# Patient Record
Sex: MALE | Race: WHITE | Employment: FULL TIME | URBAN - METROPOLITAN AREA
[De-identification: names, ages, dates, MRNs, and addresses within clinical notes are randomized per-mention and may not be internally consistent; named-entity substitution may affect disease eponyms.]

---

## 2021-06-04 ENCOUNTER — OFFICE VISIT (OUTPATIENT)
Dept: PRIMARY CARE CLINIC | Age: 41
End: 2021-06-04
Payer: COMMERCIAL

## 2021-06-04 DIAGNOSIS — E03.9 ACQUIRED HYPOTHYROIDISM: ICD-10-CM

## 2021-06-04 DIAGNOSIS — Z00.01 ENCOUNTER FOR ANNUAL GENERAL MEDICAL EXAMINATION WITH ABNORMAL FINDINGS IN ADULT: ICD-10-CM

## 2021-06-04 DIAGNOSIS — F42.8 OTHER OBSESSIVE-COMPULSIVE DISORDERS: ICD-10-CM

## 2021-06-04 DIAGNOSIS — F41.0 PANIC ATTACKS: ICD-10-CM

## 2021-06-04 DIAGNOSIS — F41.9 ANXIETY: Primary | ICD-10-CM

## 2021-06-04 DIAGNOSIS — E78.2 MIXED HYPERLIPIDEMIA: ICD-10-CM

## 2021-06-04 PROCEDURE — 99386 PREV VISIT NEW AGE 40-64: CPT | Performed by: FAMILY MEDICINE

## 2021-06-04 RX ORDER — ESCITALOPRAM OXALATE 10 MG/1
TABLET ORAL
Qty: 30 TABLET | Refills: 3 | Status: SHIPPED
Start: 2021-06-04 | End: 2021-08-11 | Stop reason: SDUPTHER

## 2021-06-04 RX ORDER — HYDROXYZINE PAMOATE 25 MG/1
25 CAPSULE ORAL 2 TIMES DAILY PRN
Qty: 50 CAPSULE | Refills: 1 | Status: SHIPPED
Start: 2021-06-04 | End: 2021-11-18 | Stop reason: SDUPTHER

## 2021-06-04 NOTE — PROGRESS NOTES
21  Name: Altagracia Cristina    : 1980    Sex: male    Age: 39 y.o. Subjective:  Chief Complaint: Patient is here for est as a new patient and anxiety     Presents to establish as a new patient. I see him in the past probably 10 years ago. He also has significant anxiety issues lately. He has some panic attacks at work. He has had this ongoing problem now for several months he refuses see a psychiatrist or counselor. He had anxiety in the past he knows what this is. He broke up with a girlfriend January. Has some family issues and does not like his job. He does not work this week. Has been drinking a little more alcohol lately. Denies depression or thoughts of hurting himself. Mostly having anxiety      Medical history is positive for anxiety 10 years ago  Hyperlipidemia    Surgical history is positive for ear tubes, adenoids, left hand fourth finger tendon rupture age 21      Social history. Does admit occasional cigars. Non-smoker of cigarettes  Single  No children  Mother is living with atrial septal defect  Father is living with diabetes and anxiety  1 sister as well  Weight is the same  Urine and bowel movements are good  EtOH daily  Drugs Daily marijuana user  Job  graphic design      Review of Systems   Constitutional: Negative. HENT: Negative. Eyes: Negative. Respiratory: Negative. Cardiovascular: Negative. Gastrointestinal: Negative. Endocrine: Negative. Genitourinary: Negative. Musculoskeletal: Negative. Skin: Negative. Allergic/Immunologic: Negative. Neurological: Negative. Hematological: Negative. Psychiatric/Behavioral: The patient is nervous/anxious.           Current Outpatient Medications:     escitalopram (LEXAPRO) 10 MG tablet, One half q am for ten days then one q am, Disp: 30 tablet, Rfl: 3    hydrOXYzine (VISTARIL) 25 MG capsule, Take 1 capsule by mouth 2 times daily as needed for Anxiety, Disp: 50 capsule, Rfl: 1 No Known Allergies  Social History     Socioeconomic History    Marital status: Single     Spouse name: Not on file    Number of children: Not on file    Years of education: Not on file    Highest education level: Not on file   Occupational History    Not on file   Tobacco Use    Smoking status: Not on file   Substance and Sexual Activity    Alcohol use: Not on file    Drug use: Not on file    Sexual activity: Not on file   Other Topics Concern    Not on file   Social History Narrative    Anxiety . Single no children    . OCD, panic attacks    . EtOH    Daily marijuana user     digital graphic design     Social Determinants of Health     Financial Resource Strain:     Difficulty of Paying Living Expenses:    Food Insecurity:     Worried About Running Out of Food in the Last Year:     [address] in the Last Year:    Transportation Needs:     Lack of Transportation (Medical):  Lack of Transportation (Non-Medical):    Physical Activity:     Days of Exercise per Week:     Minutes of Exercise per Session:    Stress:     Feeling of Stress :    Social Connections:     Frequency of Communication with Friends and Family:     Frequency of Social Gatherings with Friends and Family:     Attends Synagogue Services:     Active Member of Clubs or Organizations:     Attends Club or Organization Meetings:     Marital Status:    Intimate Partner Violence:     Fear of Current or Ex-Partner:     Emotionally Abused:     Physically Abused:     Sexually Abused:       No past medical history on file. No family history on file. No past surgical history on file. Vitals:    06/04/21 1559   BP: 128/78   Pulse: 71   Temp: 97.6 °F (36.4 °C)   TempSrc: Temporal   Weight: 220 lb 9.6 oz (100.1 kg)   Height: 6' 3\" (1.905 m)       Objective:    Physical Exam  Vitals reviewed. Constitutional:       Appearance: He is well-developed. HENT:      Head: Normocephalic.    Eyes:      Pupils: 6/25/2021).      Seen by:  Dolly Donovan, DO

## 2021-06-06 VITALS
HEIGHT: 75 IN | SYSTOLIC BLOOD PRESSURE: 128 MMHG | DIASTOLIC BLOOD PRESSURE: 78 MMHG | HEART RATE: 71 BPM | BODY MASS INDEX: 27.43 KG/M2 | WEIGHT: 220.6 LBS | TEMPERATURE: 97.6 F

## 2021-06-06 PROBLEM — F41.9 ANXIETY: Status: ACTIVE | Noted: 2021-06-06

## 2021-06-06 PROBLEM — E78.2 MIXED HYPERLIPIDEMIA: Status: ACTIVE | Noted: 2021-06-06

## 2021-06-06 PROBLEM — F42.9 OBSESSIVE COMPULSIVE DISORDER: Status: ACTIVE | Noted: 2021-06-06

## 2021-06-06 PROBLEM — F41.0 PANIC ATTACKS: Status: ACTIVE | Noted: 2021-06-06

## 2021-06-06 ASSESSMENT — PATIENT HEALTH QUESTIONNAIRE - PHQ9
SUM OF ALL RESPONSES TO PHQ QUESTIONS 1-9: 0
SUM OF ALL RESPONSES TO PHQ QUESTIONS 1-9: 0
SUM OF ALL RESPONSES TO PHQ9 QUESTIONS 1 & 2: 0
SUM OF ALL RESPONSES TO PHQ QUESTIONS 1-9: 0
1. LITTLE INTEREST OR PLEASURE IN DOING THINGS: 0
2. FEELING DOWN, DEPRESSED OR HOPELESS: 0

## 2021-06-06 ASSESSMENT — ENCOUNTER SYMPTOMS
EYES NEGATIVE: 1
ALLERGIC/IMMUNOLOGIC NEGATIVE: 1
RESPIRATORY NEGATIVE: 1
GASTROINTESTINAL NEGATIVE: 1

## 2021-06-07 ENCOUNTER — TELEPHONE (OUTPATIENT)
Dept: PRIMARY CARE CLINIC | Age: 41
End: 2021-06-07

## 2021-06-07 DIAGNOSIS — E03.9 ACQUIRED HYPOTHYROIDISM: ICD-10-CM

## 2021-06-07 DIAGNOSIS — Z00.01 ENCOUNTER FOR ANNUAL GENERAL MEDICAL EXAMINATION WITH ABNORMAL FINDINGS IN ADULT: ICD-10-CM

## 2021-06-07 LAB
ALBUMIN SERPL-MCNC: 4.8 G/DL (ref 3.5–5.2)
ALP BLD-CCNC: 56 U/L (ref 40–129)
ALT SERPL-CCNC: 15 U/L (ref 0–40)
ANION GAP SERPL CALCULATED.3IONS-SCNC: 12 MMOL/L (ref 7–16)
AST SERPL-CCNC: 17 U/L (ref 0–39)
BASOPHILS ABSOLUTE: 0.05 E9/L (ref 0–0.2)
BASOPHILS RELATIVE PERCENT: 1 % (ref 0–2)
BILIRUB SERPL-MCNC: 0.5 MG/DL (ref 0–1.2)
BUN BLDV-MCNC: 18 MG/DL (ref 6–20)
CALCIUM SERPL-MCNC: 9.6 MG/DL (ref 8.6–10.2)
CHLORIDE BLD-SCNC: 102 MMOL/L (ref 98–107)
CHOLESTEROL, TOTAL: 212 MG/DL (ref 0–199)
CO2: 25 MMOL/L (ref 22–29)
CREAT SERPL-MCNC: 1.2 MG/DL (ref 0.7–1.2)
EOSINOPHILS ABSOLUTE: 0.08 E9/L (ref 0.05–0.5)
EOSINOPHILS RELATIVE PERCENT: 1.7 % (ref 0–6)
GFR AFRICAN AMERICAN: >60
GFR NON-AFRICAN AMERICAN: >60 ML/MIN/1.73
GLUCOSE BLD-MCNC: 109 MG/DL (ref 74–99)
HCT VFR BLD CALC: 45.5 % (ref 37–54)
HDLC SERPL-MCNC: 64 MG/DL
HEMOGLOBIN: 14.6 G/DL (ref 12.5–16.5)
IMMATURE GRANULOCYTES #: 0.01 E9/L
IMMATURE GRANULOCYTES %: 0.2 % (ref 0–5)
LDL CHOLESTEROL CALCULATED: 128 MG/DL (ref 0–99)
LYMPHOCYTES ABSOLUTE: 1.73 E9/L (ref 1.5–4)
LYMPHOCYTES RELATIVE PERCENT: 36.3 % (ref 20–42)
MCH RBC QN AUTO: 31.3 PG (ref 26–35)
MCHC RBC AUTO-ENTMCNC: 32.1 % (ref 32–34.5)
MCV RBC AUTO: 97.4 FL (ref 80–99.9)
MONOCYTES ABSOLUTE: 0.5 E9/L (ref 0.1–0.95)
MONOCYTES RELATIVE PERCENT: 10.5 % (ref 2–12)
NEUTROPHILS ABSOLUTE: 2.4 E9/L (ref 1.8–7.3)
NEUTROPHILS RELATIVE PERCENT: 50.3 % (ref 43–80)
PDW BLD-RTO: 12.8 FL (ref 11.5–15)
PLATELET # BLD: 302 E9/L (ref 130–450)
PMV BLD AUTO: 10.7 FL (ref 7–12)
POTASSIUM SERPL-SCNC: 4.2 MMOL/L (ref 3.5–5)
PROSTATE SPECIFIC ANTIGEN: 1.55 NG/ML (ref 0–4)
RBC # BLD: 4.67 E12/L (ref 3.8–5.8)
SODIUM BLD-SCNC: 139 MMOL/L (ref 132–146)
TOTAL PROTEIN: 6.9 G/DL (ref 6.4–8.3)
TRIGL SERPL-MCNC: 99 MG/DL (ref 0–149)
TSH SERPL DL<=0.05 MIU/L-ACNC: 1.57 UIU/ML (ref 0.27–4.2)
VLDLC SERPL CALC-MCNC: 20 MG/DL
WBC # BLD: 4.8 E9/L (ref 4.5–11.5)

## 2021-06-08 ENCOUNTER — TELEPHONE (OUTPATIENT)
Dept: PRIMARY CARE CLINIC | Age: 41
End: 2021-06-08

## 2021-06-08 NOTE — TELEPHONE ENCOUNTER
Left message to return call. Patient notified. Will watch sweets and carbs along with fatty foods. Will call to schedule an appointment in a few weeks.

## 2021-06-08 NOTE — TELEPHONE ENCOUNTER
Tell patient his lab is okay. Cholesterol just a hair up to 12. Watch greasy fried foods and fats. Also his fasting sugar is 109. Ask if he fasted for that blood work. I like to see him in about 3weeks with fasting fingerstick sugar and hemoglobin A1c.   Have him cut back on sugars and carbs

## 2021-07-12 DIAGNOSIS — F41.9 ANXIETY: ICD-10-CM

## 2021-08-11 ENCOUNTER — TELEPHONE (OUTPATIENT)
Dept: PRIMARY CARE CLINIC | Age: 41
End: 2021-08-11

## 2021-08-11 DIAGNOSIS — F41.9 ANXIETY: ICD-10-CM

## 2021-08-11 RX ORDER — ESCITALOPRAM OXALATE 10 MG/1
TABLET ORAL
Qty: 30 TABLET | Refills: 3 | Status: SHIPPED
Start: 2021-08-11 | End: 2021-11-18 | Stop reason: SDUPTHER

## 2021-08-11 NOTE — TELEPHONE ENCOUNTER
Patient states he is unable to fill his Rx due to insurance issue.   I advised to check on cash price or Good [facility]

## 2021-11-18 DIAGNOSIS — F41.9 ANXIETY: ICD-10-CM

## 2021-11-18 RX ORDER — ESCITALOPRAM OXALATE 10 MG/1
TABLET ORAL
Qty: 30 TABLET | Refills: 5 | Status: SHIPPED
Start: 2021-11-18 | End: 2022-06-10 | Stop reason: SDUPTHER

## 2021-11-18 RX ORDER — HYDROXYZINE PAMOATE 25 MG/1
25 CAPSULE ORAL 2 TIMES DAILY PRN
Qty: 50 CAPSULE | Refills: 1 | Status: SHIPPED
Start: 2021-11-18 | End: 2022-02-12 | Stop reason: SDUPTHER

## 2021-12-11 ENCOUNTER — TELEPHONE (OUTPATIENT)
Dept: PRIMARY CARE CLINIC | Age: 41
End: 2021-12-11

## 2022-02-12 DIAGNOSIS — F41.9 ANXIETY: ICD-10-CM

## 2022-02-12 RX ORDER — HYDROXYZINE PAMOATE 25 MG/1
25 CAPSULE ORAL 2 TIMES DAILY PRN
Qty: 50 CAPSULE | Refills: 5 | Status: SHIPPED
Start: 2022-02-12 | End: 2022-06-10 | Stop reason: SDUPTHER

## 2022-06-09 DIAGNOSIS — F41.9 ANXIETY: ICD-10-CM

## 2022-06-10 RX ORDER — ESCITALOPRAM OXALATE 10 MG/1
TABLET ORAL
Qty: 90 TABLET | Refills: 5 | Status: SHIPPED | OUTPATIENT
Start: 2022-06-10

## 2022-06-10 RX ORDER — HYDROXYZINE PAMOATE 25 MG/1
25 CAPSULE ORAL 2 TIMES DAILY PRN
Qty: 50 CAPSULE | Refills: 5 | Status: SHIPPED | OUTPATIENT
Start: 2022-06-10

## 2024-02-12 ENCOUNTER — TELEPHONE (OUTPATIENT)
Dept: PRIMARY CARE CLINIC | Age: 44
End: 2024-02-12

## 2024-02-12 DIAGNOSIS — Z00.01 ENCOUNTER FOR ANNUAL GENERAL MEDICAL EXAMINATION WITH ABNORMAL FINDINGS IN ADULT: Primary | ICD-10-CM

## 2024-11-26 ENCOUNTER — TELEPHONE (OUTPATIENT)
Dept: PRIMARY CARE CLINIC | Age: 44
End: 2024-11-26